# Patient Record
Sex: FEMALE | Race: OTHER | HISPANIC OR LATINO | URBAN - METROPOLITAN AREA
[De-identification: names, ages, dates, MRNs, and addresses within clinical notes are randomized per-mention and may not be internally consistent; named-entity substitution may affect disease eponyms.]

---

## 2017-09-01 ENCOUNTER — OUTPATIENT (OUTPATIENT)
Dept: OUTPATIENT SERVICES | Facility: HOSPITAL | Age: 82
LOS: 1 days | End: 2017-09-01

## 2017-09-05 DIAGNOSIS — R69 ILLNESS, UNSPECIFIED: ICD-10-CM

## 2019-07-01 ENCOUNTER — INPATIENT (INPATIENT)
Facility: HOSPITAL | Age: 84
LOS: 0 days | Discharge: NURSING, REHAB OR LONG-TERM CARE | End: 2019-07-02
Attending: INTERNAL MEDICINE | Admitting: INTERNAL MEDICINE
Payer: MEDICAID

## 2019-07-01 VITALS
RESPIRATION RATE: 16 BRPM | SYSTOLIC BLOOD PRESSURE: 128 MMHG | DIASTOLIC BLOOD PRESSURE: 62 MMHG | OXYGEN SATURATION: 93 % | HEART RATE: 66 BPM
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
## 2019-07-01 NOTE — ED PROVIDER NOTE - ATTENDING CONTRIBUTION TO CARE
Afebrile. Awake and Alert. Lungs CTA. Heart RRR. Abdomen soft NTND. CN II-XII grossly intact. Moves all extremities without lateralization.    Calm  Nursing home will not accept pt back without geripsych consult  Pt will need admission for observation of behavior and psych c/s  CTH, CXR, UA, labs, r/o infectious/metabolic sources of behavior distrubance
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
## 2019-07-01 NOTE — ED PROVIDER NOTE - CLINICAL SUMMARY MEDICAL DECISION MAKING FREE TEXT BOX
88 yo F sent in for agitation/aggression. r/o infectious cause of delirium vs need for med titration. Patient current calm/cooperative but poor historian. cbc, cmp, u/a, ekg, cxr.

## 2019-07-01 NOTE — ED PROVIDER NOTE - PHYSICAL EXAMINATION
VITALS: reviewed  GEN: NAD, A & O x 2.5  HEAD/EYES: NCAT, PERRL, EOMI, anicteric sclerae,  ENT: mucus membranes moist, oropharynx WNL, trachea midline  RESP: lungs CTA with equal breath sounds bilaterally, chest wall nontender and atraumatic  CV: heart with reg rhythm S1, S2, distal pulses intact and symmetric bilaterally  ABDOMEN: normoactive bowel sounds, soft, nondistended, nontender, no palpable masses  : no CVAT  MSK: extremities atraumatic and nontender, no edema, no asymmetry.  SKIN: warm, dry, no rash, no bruising, no cyanosis.  PSYCH: Affect appropriate

## 2019-07-01 NOTE — ED ADULT NURSE NOTE - OBJECTIVE STATEMENT
88y/o female aaox3 and ambulatory sent from [facility] for aggressive behavior as per triage note. Pt primarily Cuban speaking, attempted to use Cuban translation services; pt not accepting. Pt denies any pain, SOB, dizziness, N/V/D. PMH of dementia, schizophrenia, parkinson's, HTN. Pt calm and cooperative. Vital signs as noted. 20g in RAC; labs collected and sent as per MD order. Call light within reach. Bed in lowest position. Will continue to monitor.

## 2019-07-02 VITALS
TEMPERATURE: 97 F | RESPIRATION RATE: 16 BRPM | OXYGEN SATURATION: 97 % | HEART RATE: 79 BPM | SYSTOLIC BLOOD PRESSURE: 132 MMHG | DIASTOLIC BLOOD PRESSURE: 66 MMHG

## 2019-07-02 DIAGNOSIS — F03.91 UNSPECIFIED DEMENTIA WITH BEHAVIORAL DISTURBANCE: ICD-10-CM

## 2019-07-02 DIAGNOSIS — R41.9 UNSPECIFIED SYMPTOMS AND SIGNS INVOLVING COGNITIVE FUNCTIONS AND AWARENESS: ICD-10-CM

## 2019-07-02 DIAGNOSIS — E03.9 HYPOTHYROIDISM, UNSPECIFIED: ICD-10-CM

## 2019-07-02 DIAGNOSIS — Z29.9 ENCOUNTER FOR PROPHYLACTIC MEASURES, UNSPECIFIED: ICD-10-CM

## 2019-07-02 DIAGNOSIS — R45.1 RESTLESSNESS AND AGITATION: ICD-10-CM

## 2019-07-02 DIAGNOSIS — F29 UNSPECIFIED PSYCHOSIS NOT DUE TO A SUBSTANCE OR KNOWN PHYSIOLOGICAL CONDITION: ICD-10-CM

## 2019-07-02 DIAGNOSIS — G20 PARKINSON'S DISEASE: ICD-10-CM

## 2019-07-02 DIAGNOSIS — F69 UNSPECIFIED DISORDER OF ADULT PERSONALITY AND BEHAVIOR: ICD-10-CM

## 2019-07-02 DIAGNOSIS — R56.9 UNSPECIFIED CONVULSIONS: ICD-10-CM

## 2019-07-02 LAB
ALBUMIN SERPL ELPH-MCNC: 4.3 G/DL — SIGNIFICANT CHANGE UP (ref 3.3–5)
ALP SERPL-CCNC: 97 U/L — SIGNIFICANT CHANGE UP (ref 40–120)
ALT FLD-CCNC: 6 U/L — SIGNIFICANT CHANGE UP (ref 4–33)
ANION GAP SERPL CALC-SCNC: 14 MMO/L — SIGNIFICANT CHANGE UP (ref 7–14)
APPEARANCE UR: CLEAR — SIGNIFICANT CHANGE UP
AST SERPL-CCNC: 13 U/L — SIGNIFICANT CHANGE UP (ref 4–32)
BACTERIA # UR AUTO: NEGATIVE — SIGNIFICANT CHANGE UP
BASOPHILS # BLD AUTO: 0.06 K/UL — SIGNIFICANT CHANGE UP (ref 0–0.2)
BASOPHILS NFR BLD AUTO: 0.8 % — SIGNIFICANT CHANGE UP (ref 0–2)
BILIRUB SERPL-MCNC: 0.3 MG/DL — SIGNIFICANT CHANGE UP (ref 0.2–1.2)
BILIRUB UR-MCNC: NEGATIVE — SIGNIFICANT CHANGE UP
BLOOD UR QL VISUAL: NEGATIVE — SIGNIFICANT CHANGE UP
BUN SERPL-MCNC: 21 MG/DL — SIGNIFICANT CHANGE UP (ref 7–23)
CALCIUM SERPL-MCNC: 9.7 MG/DL — SIGNIFICANT CHANGE UP (ref 8.4–10.5)
CHLORIDE SERPL-SCNC: 97 MMOL/L — LOW (ref 98–107)
CO2 SERPL-SCNC: 28 MMOL/L — SIGNIFICANT CHANGE UP (ref 22–31)
COLOR SPEC: SIGNIFICANT CHANGE UP
CREAT SERPL-MCNC: 0.68 MG/DL — SIGNIFICANT CHANGE UP (ref 0.5–1.3)
EOSINOPHIL # BLD AUTO: 0.11 K/UL — SIGNIFICANT CHANGE UP (ref 0–0.5)
EOSINOPHIL NFR BLD AUTO: 1.4 % — SIGNIFICANT CHANGE UP (ref 0–6)
GLUCOSE SERPL-MCNC: 104 MG/DL — HIGH (ref 70–99)
GLUCOSE UR-MCNC: NEGATIVE — SIGNIFICANT CHANGE UP
HCT VFR BLD CALC: 37.8 % — SIGNIFICANT CHANGE UP (ref 34.5–45)
HGB BLD-MCNC: 12.5 G/DL — SIGNIFICANT CHANGE UP (ref 11.5–15.5)
HYALINE CASTS # UR AUTO: NEGATIVE — SIGNIFICANT CHANGE UP
IMM GRANULOCYTES NFR BLD AUTO: 0.1 % — SIGNIFICANT CHANGE UP (ref 0–1.5)
KETONES UR-MCNC: NEGATIVE — SIGNIFICANT CHANGE UP
LEUKOCYTE ESTERASE UR-ACNC: SIGNIFICANT CHANGE UP
LYMPHOCYTES # BLD AUTO: 3.09 K/UL — SIGNIFICANT CHANGE UP (ref 1–3.3)
LYMPHOCYTES # BLD AUTO: 39.6 % — SIGNIFICANT CHANGE UP (ref 13–44)
MCHC RBC-ENTMCNC: 30.8 PG — SIGNIFICANT CHANGE UP (ref 27–34)
MCHC RBC-ENTMCNC: 33.1 % — SIGNIFICANT CHANGE UP (ref 32–36)
MCV RBC AUTO: 93.1 FL — SIGNIFICANT CHANGE UP (ref 80–100)
MONOCYTES # BLD AUTO: 0.68 K/UL — SIGNIFICANT CHANGE UP (ref 0–0.9)
MONOCYTES NFR BLD AUTO: 8.7 % — SIGNIFICANT CHANGE UP (ref 2–14)
NEUTROPHILS # BLD AUTO: 3.86 K/UL — SIGNIFICANT CHANGE UP (ref 1.8–7.4)
NEUTROPHILS NFR BLD AUTO: 49.4 % — SIGNIFICANT CHANGE UP (ref 43–77)
NITRITE UR-MCNC: NEGATIVE — SIGNIFICANT CHANGE UP
NRBC # FLD: 0.02 K/UL — SIGNIFICANT CHANGE UP (ref 0–0)
PH UR: 7.5 — SIGNIFICANT CHANGE UP (ref 5–8)
PLATELET # BLD AUTO: 280 K/UL — SIGNIFICANT CHANGE UP (ref 150–400)
PMV BLD: 9.1 FL — SIGNIFICANT CHANGE UP (ref 7–13)
POTASSIUM SERPL-MCNC: 4.2 MMOL/L — SIGNIFICANT CHANGE UP (ref 3.5–5.3)
POTASSIUM SERPL-SCNC: 4.2 MMOL/L — SIGNIFICANT CHANGE UP (ref 3.5–5.3)
PROT SERPL-MCNC: 8.8 G/DL — HIGH (ref 6–8.3)
PROT UR-MCNC: NEGATIVE — SIGNIFICANT CHANGE UP
RBC # BLD: 4.06 M/UL — SIGNIFICANT CHANGE UP (ref 3.8–5.2)
RBC # FLD: 13.4 % — SIGNIFICANT CHANGE UP (ref 10.3–14.5)
RBC CASTS # UR COMP ASSIST: SIGNIFICANT CHANGE UP (ref 0–?)
SODIUM SERPL-SCNC: 139 MMOL/L — SIGNIFICANT CHANGE UP (ref 135–145)
SP GR SPEC: 1.02 — SIGNIFICANT CHANGE UP (ref 1–1.04)
SQUAMOUS # UR AUTO: SIGNIFICANT CHANGE UP
TSH SERPL-MCNC: 5.11 UIU/ML — HIGH (ref 0.27–4.2)
UROBILINOGEN FLD QL: NORMAL — SIGNIFICANT CHANGE UP
WBC # BLD: 7.81 K/UL — SIGNIFICANT CHANGE UP (ref 3.8–10.5)
WBC # FLD AUTO: 7.81 K/UL — SIGNIFICANT CHANGE UP (ref 3.8–10.5)
WBC UR QL: SIGNIFICANT CHANGE UP (ref 0–?)

## 2019-07-02 PROCEDURE — 70450 CT HEAD/BRAIN W/O DYE: CPT | Mod: 26

## 2019-07-02 PROCEDURE — 71045 X-RAY EXAM CHEST 1 VIEW: CPT | Mod: 26

## 2019-07-02 PROCEDURE — 99223 1ST HOSP IP/OBS HIGH 75: CPT

## 2019-07-02 PROCEDURE — 90792 PSYCH DIAG EVAL W/MED SRVCS: CPT

## 2019-07-02 RX ORDER — SERTRALINE 25 MG/1
1 TABLET, FILM COATED ORAL
Qty: 0 | Refills: 0 | DISCHARGE

## 2019-07-02 RX ORDER — DIVALPROEX SODIUM 500 MG/1
1 TABLET, DELAYED RELEASE ORAL
Qty: 0 | Refills: 0 | DISCHARGE

## 2019-07-02 RX ORDER — B-COMPLEX WITH VITAMIN C
1 CAPSULE ORAL
Qty: 0 | Refills: 0 | DISCHARGE

## 2019-07-02 RX ORDER — SERTRALINE 25 MG/1
100 TABLET, FILM COATED ORAL DAILY
Refills: 0 | Status: DISCONTINUED | OUTPATIENT
Start: 2019-07-02 | End: 2019-07-02

## 2019-07-02 RX ORDER — BENZTROPINE MESYLATE 1 MG
1 TABLET ORAL
Qty: 0 | Refills: 0 | DISCHARGE

## 2019-07-02 RX ORDER — QUETIAPINE FUMARATE 200 MG/1
1 TABLET, FILM COATED ORAL
Qty: 0 | Refills: 0 | DISCHARGE

## 2019-07-02 RX ORDER — FUROSEMIDE 40 MG
40 TABLET ORAL DAILY
Refills: 0 | Status: DISCONTINUED | OUTPATIENT
Start: 2019-07-02 | End: 2019-07-02

## 2019-07-02 RX ORDER — QUETIAPINE FUMARATE 200 MG/1
50 TABLET, FILM COATED ORAL
Refills: 0 | Status: DISCONTINUED | OUTPATIENT
Start: 2019-07-02 | End: 2019-07-02

## 2019-07-02 RX ORDER — LEVOTHYROXINE SODIUM 125 MCG
1 TABLET ORAL
Qty: 0 | Refills: 0 | DISCHARGE

## 2019-07-02 RX ORDER — FUROSEMIDE 40 MG
1 TABLET ORAL
Qty: 0 | Refills: 0 | DISCHARGE

## 2019-07-02 RX ORDER — HALOPERIDOL DECANOATE 100 MG/ML
2.5 INJECTION INTRAMUSCULAR ONCE
Refills: 0 | Status: COMPLETED | OUTPATIENT
Start: 2019-07-02 | End: 2019-07-02

## 2019-07-02 RX ORDER — BENZTROPINE MESYLATE 1 MG
1 TABLET ORAL DAILY
Refills: 0 | Status: DISCONTINUED | OUTPATIENT
Start: 2019-07-02 | End: 2019-07-02

## 2019-07-02 RX ORDER — ENOXAPARIN SODIUM 100 MG/ML
40 INJECTION SUBCUTANEOUS EVERY 24 HOURS
Refills: 0 | Status: DISCONTINUED | OUTPATIENT
Start: 2019-07-02 | End: 2019-07-02

## 2019-07-02 RX ORDER — ACETAMINOPHEN 500 MG
2 TABLET ORAL
Qty: 0 | Refills: 0 | DISCHARGE

## 2019-07-02 RX ORDER — DIVALPROEX SODIUM 500 MG/1
500 TABLET, DELAYED RELEASE ORAL THREE TIMES A DAY
Refills: 0 | Status: DISCONTINUED | OUTPATIENT
Start: 2019-07-02 | End: 2019-07-02

## 2019-07-02 RX ORDER — HEPARIN SODIUM 5000 [USP'U]/ML
5000 INJECTION INTRAVENOUS; SUBCUTANEOUS EVERY 8 HOURS
Refills: 0 | Status: DISCONTINUED | OUTPATIENT
Start: 2019-07-02 | End: 2019-07-02

## 2019-07-02 RX ORDER — ACETAMINOPHEN 500 MG
650 TABLET ORAL EVERY 6 HOURS
Refills: 0 | Status: DISCONTINUED | OUTPATIENT
Start: 2019-07-02 | End: 2019-07-02

## 2019-07-02 RX ORDER — LEVOTHYROXINE SODIUM 125 MCG
50 TABLET ORAL DAILY
Refills: 0 | Status: DISCONTINUED | OUTPATIENT
Start: 2019-07-02 | End: 2019-07-02

## 2019-07-02 RX ADMIN — HALOPERIDOL DECANOATE 2.5 MILLIGRAM(S): 100 INJECTION INTRAMUSCULAR at 12:34

## 2019-07-02 RX ADMIN — Medication 1 MILLIGRAM(S): at 12:35

## 2019-07-02 NOTE — H&P ADULT - NSHPREVIEWOFSYSTEMS_GEN_ALL_CORE
CONSTITUTIONAL: No fever; No chills; No night sweats; No weight loss; No fatigue  EYES: No eye pain; No blurry vision  ENMT:  No difficulty hearing; No sinus or throat pain  NECK: No pain or stiffness  RESPIRATORY: No cough; No wheezing; No hemoptysis; No shortness of breath; No sputum production  CARDIOVASCULAR: No chest pain; No palpitations; No leg swelling  GASTROINTESTINAL: No abdominal pain; No nausea; No vomiting; No hematemesis; No diarrhea; No constipation. No melena or hematochezia.  GENITOURINARY: No dysuria; No frequency; No hematuria; No incontinence  NEUROLOGICAL: No headaches; No loss of strength; No numbness  SKIN: No itching/burning; No rashes or lesions   MUSCULOSKELETAL: No joint pain or swelling; left upper chest wall pain  HEME/LYMPH: No easy bruising, or bleeding gums

## 2019-07-02 NOTE — DISCHARGE NOTE PROVIDER - CARE PROVIDER_API CALL
NH provider,   NH provider, medicine and pysch  Phone: (   )    -  Fax: (   )    -  Follow Up Time: 1 week

## 2019-07-02 NOTE — ED BEHAVIORAL HEALTH ASSESSMENT NOTE - SAFETY PLAN DETAILS
discussed with pt and staff to call 911 or return to the ED if symptoms worsen, pt has thoughts of hurting herself or anyone else.

## 2019-07-02 NOTE — ED ADULT NURSE REASSESSMENT NOTE - CONDITION
Pt. requesting to see doctor, wants to go home. Wandering into ambulance bay several times. Placed on 1:1 observation. MD evaluation in progress.
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
## 2019-07-02 NOTE — ED BEHAVIORAL HEALTH NOTE - BEHAVIORAL HEALTH NOTE
Patient seen briefly in low acuity, Cape Verdean speaking female. Per staff was agitated and medicated.   Pt is now sedated speaking with her eyes closed, reports being tired and wanting to sleep in her own bed.   Unable to fully interview at this time.   Collateral pending. Patient seen briefly in low acuity, [place] Islander speaking female. Per staff was agitated and medicated.   Pt is now sedated speaking with her eyes closed, reports being tired and wanting to sleep in her own bed.   Unable to fully interview at this time.   Collateral pending.  Will monitor and re-interview when appropriate.
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
## 2019-07-02 NOTE — ED ADULT NURSE REASSESSMENT NOTE - GENERAL PATIENT STATE
comfortable appearance/resting/sleeping
comfortable appearance/cooperative/Pt sitting while being interviewed

## 2019-07-02 NOTE — H&P ADULT - NSHPPHYSICALEXAM_GEN_ALL_CORE
T(C): 36.6 (07-02-19 @ 05:56), Max: 36.9 (07-01-19 @ 23:49)  HR: 56 (07-02-19 @ 05:56) (56 - 78)  BP: 114/58 (07-02-19 @ 05:56) (114/58 - 154/62)  RR: 16 (07-02-19 @ 05:56) (16 - 16)  SpO2: 97% (07-02-19 @ 05:56) (93% - 99%)    GENERAL: no apparent distress, on room air  HEAD:  Atraumatic, Normocephalic  EYES: EOMI, PERRLA, conjunctiva and sclera clear b/l  NECK: No cervical lymphadenopathy; no obvious masses.   CHEST/LUNG: Clear to auscultation bilaterally; No wheezing or crackles  HEART: Regular rate and rhythm; No obvious murmurs; nl S1/S2; No peripheral edema  ABDOMEN: Soft, Nontender, Nondistended; Bowel sounds present  EXTREMITIES:  2+ Peripheral Pulses; No joint swelling. tender on palpation over left chest wall. worsen with arm movement.   PSYCH: normal affect, calm demeanor  NEUROLOGY: Awake and alert; CN 2-12 grossly intact; no obvious FND  SKIN: No rashes or lesions

## 2019-07-02 NOTE — H&P ADULT - ASSESSMENT
87F from [facility] with h/o schizophrenia, delusion d/o, Bipolar d/o, Hypothyroid, HTN, Parkinson's, Vertigo was send from NH for agitation and aggression. No medical issue found on presentation.

## 2019-07-02 NOTE — ED BEHAVIORAL HEALTH ASSESSMENT NOTE - DESCRIPTION
parkinson's disease, HTN lives in NH, no children, not  Vital Signs  Temperature  Temp (F): 97 Degrees F  Temp (C) Temp (C): 36.1 Degrees C  Temp site Temp Site: oral  Heart Rate  Heart Rate Heart Rate (beats/min): 79 /min  Heart Rate Method: noninvasive blood pressure monitor  Heart Rate Rhythm: radial pulse regular  Noninvasive Blood Pressure  BP Systolic Systolic: 132 mm Hg  BP Diastolic Diastolic (mm Hg): 66 mm Hg  Blood Pressure - Site Site: left upper arm  Blood Pressure - Method Method: electronic  Respiratory/Pulse Oximetry  Respiration Rate (breaths/min) Respiration Rate (breaths/min): 16 /min  SpO2 (%) SpO2 (%): 97 %  O2 delivery Patient On: room air

## 2019-07-02 NOTE — H&P ADULT - PROBLEM SELECTOR PLAN 1
patient is very anxious and agitated in ED. redirectable.  c/w current home regiemn for schizophrenia, bipolar.   ativan 1mg x1 PO now  psych consulted. f/u recommendations  patient is medically cleared for discharge if patient is candidate for inpatient psych management at [facility]. patient is very anxious and agitated in ED. redirectable.  c/w current home regimen for schizophrenia, bipolar.  will give Haldol 2.5 IV once now.   psych consulted. f/u recommendations  patient is medically cleared for discharge if patient is candidate for inpatient psych management at [facility].

## 2019-07-02 NOTE — DISCHARGE NOTE PROVIDER - PROVIDER TOKENS
FREE:[LAST:[NH provider],PHONE:[(   )    -],FAX:[(   )    -],ADDRESS:[NH provider, medicine and [facility]],FOLLOWUP:[1 week]]

## 2019-07-02 NOTE — H&P ADULT - NSICDXPASTMEDICALHX_GEN_ALL_CORE_FT
PAST MEDICAL HISTORY:  Alzheimer's dementia with behavioral disturbance     Bipolar disorder     Constipation     Delusional disorders     Difficulty walking     Essential hypertension     Functional dyspepsia     Hypothyroid     Major depressive disorder     Mixed hyperlipidemia     Parkinson's disease     Schizophrenia

## 2019-07-02 NOTE — H&P ADULT - PROBLEM SELECTOR PLAN 2
TSH 5  c/w synthroid for now.   unclear if dose had recently been adjusted, and patient was recently admitted to [facility].

## 2019-07-02 NOTE — ED BEHAVIORAL HEALTH ASSESSMENT NOTE - PRIOR MEDICATION SIDE EFFECTS OR ADVERSE REACTIONS
----- Message from Megan Lopez MD sent at 10/10/2017  7:14 PM CDT -----  Regarding: MRI and blood result  Guille Rubin    Could you call her for PRL 41, and IGF is slightly high, which need to be repeated next month. Since sometimes false positive.    Also I told her to do MRI within this year, but if she can go before thankgiving, would be better.    Megan   None known

## 2019-07-02 NOTE — ED BEHAVIORAL HEALTH ASSESSMENT NOTE - SUMMARY
Patient is an 88 y/o [place] female, single, retired, living in  [facility], with a documented history of Parkinson's disease, dementia, HLD, HTN, hypothyroidism, hx seizures last one 2015  varying documented psychiatric conditions (bipolar, schizophrenia, MDD), who presented for evaluation of agitation while in the NH. Patient with agitation episode while in the NH, episode of paranoia. She does not recall these events. Now calm, cooperative and pleasant. Free of psychotic symptoms, denied feeling depressed, no thoughts of harming herself or anyone else. At the current time, does not appear to be a danger to self or others.

## 2019-07-02 NOTE — ED BEHAVIORAL HEALTH ASSESSMENT NOTE - OTHER PAST PSYCHIATRIC HISTORY (INCLUDE DETAILS REGARDING ONSET, COURSE OF ILLNESS, INPATIENT/OUTPATIENT TREATMENT)
per chart from NH multiple diagnosis (bipolar, schizophrenia, MDD)   no known admissions/suicide attempts

## 2019-07-02 NOTE — ED BEHAVIORAL HEALTH ASSESSMENT NOTE - RISK ASSESSMENT
Patient lives in a supervised setting, compliant with medications, no past suicide attempts, no previous episodes of violence or agitation. Pt does have a dementia diagnosis with behavioral disturbance which may elevate her risk for agitation.

## 2019-07-02 NOTE — ED BEHAVIORAL HEALTH ASSESSMENT NOTE - ADDITIONAL DETAILS / COMMENTS
pt with neurocognitive disorder with impaired insight, pt is compliant with medications, aware she has medical issues however does not know which ones

## 2019-07-02 NOTE — ED BEHAVIORAL HEALTH ASSESSMENT NOTE - HPI (INCLUDE ILLNESS QUALITY, SEVERITY, DURATION, TIMING, CONTEXT, MODIFYING FACTORS, ASSOCIATED SIGNS AND SYMPTOMS)
Patient is an 88 y/o [place] Islander female, single, retired, living in  [facility], with a documented history of Parkinson's disease, dementia, HLD, varying documented psychiatric conditions (bipolar, schizophrenia, MDD), who presented for evaluation of agitation while in the NH.     Chart reviewed. Pt was agitated when asked to change into a hospital gown became aggressive with staff and was given Haldol 2.5 mg IM.    Patient was initially sedated. Interviewed when more awake with .     Patient was calm, cooperative, pleasant during evaluation. Pt reports she is at the hospital (doesn't know the name), states its June 2019. Pt reports she is at the hospital because "I was a little sick" reporting she had chest pain which has now subsided. Pt reports that she does not recall any of the events prior to coming to the hospital. She denied any issues with the cooking staff, denied any paranoia or concerns that her food is being poisoned. Pt reports [place] the [place] last [place] years, doesn't have children but speaks to her cousins. Pt reports good sleep and appetite. She reports her mood is "good" denied     Collateral obtained by SW from NH. See note.    Writer spoke to Dr. Rodriguez regarding pt's case. Patient is an 88 y/o Danish female, single, retired, living in  [facility], with a documented history of Parkinson's disease, dementia, HLD, HTN, hypothyroidism, hx seizures last one 2015 varying documented psychiatric conditions (bipolar, schizophrenia, MDD), who presented for evaluation of agitation while in the NH.    Chart reviewed. Pt was agitated when asked to change into a hospital gown became aggressive with staff and was given Haldol 2.5 mg IM.    Patient was initially sedated. Interviewed when more awake with .     Patient was calm, cooperative, pleasant during evaluation. Pt reports she is at the hospital (doesn't know the name), states its June 2019. Pt reports she is at the hospital because "I was a little sick" reporting she had chest pain which has now subsided. Pt reports that she does not recall any of the events prior to coming to the hospital. She denied any issues with the cooking staff, denied any paranoia or concerns that her food is being poisoned. Pt reports [place] last [place], doesn't have children but speaks to her cousins. Pt reports good sleep and appetite. She reports her mood is "good" denied feeling hopeless/helpless/depressed. Pt reports she lives at [facility] and gets along with staff and other members. Pt denied any suicidal/homicidal, denied auditory/visual hallucinations. No current manic symptoms elicited or reported.     Collateral obtained by LUCAS from NH. See note.    Writer spoke to Dr. Rodriguez regarding pt's case. Pt has been increasingly paranoid and delusional, her Seroquel has been increased to 100 mg bid, pt is also on depakote and zoloft.
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
## 2019-07-02 NOTE — ED BEHAVIORAL HEALTH ASSESSMENT NOTE - CURRENT MEDICATION
seroquel xr 100 mg bid, depakote 500 mg tid, levothyroxine 50 mg qd, lasix 40 qd, zoloft 100 qd, cogentin 1 qd, tylenol prn, haldol prn,

## 2019-07-02 NOTE — DISCHARGE NOTE PROVIDER - NSDCCPCAREPLAN_GEN_ALL_CORE_FT
PRINCIPAL DISCHARGE DIAGNOSIS  Diagnosis: Behavior concern in adult  Assessment and Plan of Treatment: Cleared by psych to return to NH  Continue medications. Follow up with your PCP for further evaluation and management. Please call to make an appointment within 1-2 weeks of discharge.      SECONDARY DISCHARGE DIAGNOSES  Diagnosis: Parkinson's disease  Assessment and Plan of Treatment: Continue medications. Follow up with your PCP for further evaluation and management. Please call to make an appointment within 1-2 weeks of discharge.    Diagnosis: Acquired hypothyroidism  Assessment and Plan of Treatment: Continue medications. Follow up with your PCP for further evaluation and management. Please call to make an appointment within 1-2 weeks of discharge.    Diagnosis: Seizure  Assessment and Plan of Treatment: Continue medications. Follow up with your PCP for further evaluation and management. Please call to make an appointment within 1-2 weeks of discharge.

## 2019-07-02 NOTE — CHART NOTE - NSCHARTNOTEFT_GEN_A_CORE
per psych patient is cleared to return to NH. Per psych patient's NH accepted patient back. ED  SW made aware, writer informed  RN will arrange for transport.   Dr. Shah made aware and patient medically cleared for discharge

## 2019-07-02 NOTE — DISCHARGE NOTE PROVIDER - HOSPITAL COURSE
87F from [facility] with h/o schizophrenia, delusion d/o, Bipolar d/o, Hypothyroid, HTN, Parkinson's, Vertigo was send from NH for agitation and aggression. No medical issue found on presentation. Seen by psychiatry and patient was cleared to return to NH     Spoke with Dr Shah, patient remains medically cleared for discharge 87F from [facility] with h/o schizophrenia, delusion d/o, Bipolar d/o, Hypothyroid, HTN, Parkinson's, Vertigo was send from NH for agitation and aggression. No medical issue found on presentation. Seen by psychiatry and patient was cleared to return to NH     Spoke with Dr Shah, patient remains medically cleared for discharge         Attending Addendum: patient with multiple psychiatric conditions brought in from NH for agitation. at this time, no concern for infectious etiology. Patient did reported 2 weeks of left sided chest pain, reproducible on exam. EKG with no concern for ACS. likely msk origin, was given Tylenol for pain. TSH high normal range. given recent prior hospitalization per NH chart, would not change synthroid dose at this time, recommend repeat TSH in 6 weeks and reassess need to adjust medication. Patient was evaluated and clear by Psychiatry to return to NH. No change to current medication.

## 2023-04-29 NOTE — ED BEHAVIORAL HEALTH NOTE - BEHAVIORAL HEALTH NOTE
LUCAS left multiple messages for LUCAS Daniels at [facility] [phone]. LUCAS will continue to follow up. Team made aware. Writer met with patient at bedside to discuss community resources and linkage to treatment. Writer and patient discussed history with alcohol use and loss of her fiance last year. Patient states that she is 11 days sober today and is staying that the [facility] through [facility]. She reports that she is linked with both mental health and substance use treatment through [facility]. Patient denies needs for other resources at this time.        JEANNA Gill  04/29/23 1133

## 2023-12-28 NOTE — DISCHARGE NOTE PROVIDER - CARE PROVIDERS DIRECT ADDRESSES
Stop Symbicort and start Trelegy 1 inhalation once a day. Rinse mouth with water and spit to prevent oral thrush.    Oxygen  -no oxygen at rest.  -2 L/m with activity and at night.   
,DirectAddress_Unknown
